# Patient Record
Sex: FEMALE | Race: WHITE
[De-identification: names, ages, dates, MRNs, and addresses within clinical notes are randomized per-mention and may not be internally consistent; named-entity substitution may affect disease eponyms.]

---

## 2018-06-20 ENCOUNTER — HOSPITAL ENCOUNTER (EMERGENCY)
Dept: HOSPITAL 41 - JD.ED | Age: 53
Discharge: HOME | End: 2018-06-20
Payer: COMMERCIAL

## 2018-06-20 DIAGNOSIS — R51: ICD-10-CM

## 2018-06-20 DIAGNOSIS — R07.89: Primary | ICD-10-CM

## 2018-06-20 DIAGNOSIS — R11.0: ICD-10-CM

## 2018-06-20 DIAGNOSIS — Z88.2: ICD-10-CM

## 2018-06-20 DIAGNOSIS — Z88.0: ICD-10-CM

## 2018-06-20 PROCEDURE — 70450 CT HEAD/BRAIN W/O DYE: CPT

## 2018-06-20 PROCEDURE — 80053 COMPREHEN METABOLIC PANEL: CPT

## 2018-06-20 PROCEDURE — 93005 ELECTROCARDIOGRAM TRACING: CPT

## 2018-06-20 PROCEDURE — 84484 ASSAY OF TROPONIN QUANT: CPT

## 2018-06-20 PROCEDURE — 85025 COMPLETE CBC W/AUTO DIFF WBC: CPT

## 2018-06-20 PROCEDURE — 96374 THER/PROPH/DIAG INJ IV PUSH: CPT

## 2018-06-20 PROCEDURE — 99285 EMERGENCY DEPT VISIT HI MDM: CPT

## 2018-06-20 PROCEDURE — 82962 GLUCOSE BLOOD TEST: CPT

## 2018-06-20 PROCEDURE — 36415 COLL VENOUS BLD VENIPUNCTURE: CPT

## 2018-06-20 PROCEDURE — 71045 X-RAY EXAM CHEST 1 VIEW: CPT

## 2018-06-20 NOTE — EDM.PDOC
ED HPI GENERAL MEDICAL PROBLEM





- General


Chief Complaint: Chest Pain


Stated Complaint: TIGHTNESS RIGHT SIDE, DIZZY


Time Seen by Provider: 06/20/18 20:38


Source of Information: Reports: Patient, Family


History Limitations: Reports: No Limitations





- History of Present Illness


INITIAL COMMENTS - FREE TEXT/NARRATIVE: 





The patient presents with sudden onset of chest tightness, dizziness and 

nausea.  This started before arrival when she was eating.  She is better now 

but a little nauseated.  She has no history of heart disease, HTN, 

hypercholesterolemia, or diabetes.  Her mother has LBBB but no other heart 

problems.  She does not smoke.  She has no swelling or pain in her legs and no 

history of DVT or PE.  She has no shortness of breath but she did get a little 

sweaty when this came on.


Onset: Sudden


Duration: Minutes:


Location: Reports: Chest


Quality: Reports: Other (Tightness)


Severity: Moderate


Improves with: Reports: None


Worsens with: Reports: None


Associated Symptoms: Reports: Chest Pain, Diaphoresis, Nausea/Vomiting.  Denies

: Confusion, Cough, Fever/Chills, Headaches, Shortness of Breath


  ** Chest


Pain Score (Numeric/FACES): 5





- Related Data


 Allergies











Allergy/AdvReac Type Severity Reaction Status Date / Time


 


penicillin V Allergy  Rash Verified 06/20/18 20:32


 


sulfacetamide Allergy  Rash Verified 06/20/18 20:32











Home Meds: 


 Home Meds





Aspirin 81 mg PO DAILY 06/20/18 [History]


Calcium Carb & Citrate/Vit D3 [Calcium + D3 ER Tablet] 1 tab PO DAILY 06/20/18 [

History]


Ethynodiol D-Ethinyl Estradiol [Kelnor 1-35 28 Tablet] 1 tab PO DAILY 06/20/18 [

History]











Past Medical History


HEENT History: Reports: Other (See Below)


Other HEENT History: TMJ





Social & Family History





- Tobacco Use


Smoking Status *Q: Never Smoker





- Recreational Drug Use


Recreational Drug Use: No





ED ROS GENERAL





- Review of Systems


Review Of Systems: See Below


Constitutional: Reports: No Symptoms


HEENT: Reports: No Symptoms


Respiratory: Reports: No Symptoms


Cardiovascular: Reports: Chest Pain, Other (Dizziness)


Endocrine: Reports: No Symptoms


GI/Abdominal: Reports: Nausea.  Denies: Abdominal Pain, Diarrhea, Vomiting


: Reports: No Symptoms


Musculoskeletal: Reports: No Symptoms





ED EXAM, GENERAL





- Physical Exam


Exam: See Below


Exam Limited By: No Limitations


General Appearance: Alert, No Apparent Distress


Ears: Normal External Exam


Nose: Normal Inspection


Head: Atraumatic, Normocephalic


Neck: Normal Inspection


Respiratory/Chest: No Respiratory Distress, Lungs Clear, Normal Breath Sounds


Cardiovascular: Regular Rate, Rhythm, No Edema, No Murmur


GI/Abdominal: Soft, Non-Tender, No Organomegaly, No Mass


Back Exam: Normal Inspection


Extremities: Normal Inspection





EKG INTERPRETATION


EKG Date: 06/20/18


Time: 20:35


Rhythm: Other (Sinus bradycardia)


Rate (Beats/Min): 57


Axis: Normal


P-Wave: Present


QRS: Normal


ST-T: Normal


QT: Normal





Course





- Vital Signs


Last Recorded V/S: 


 Last Vital Signs











Temp  97.8 F   06/20/18 20:33


 


Pulse  51 L  06/20/18 20:33


 


Resp  16   06/20/18 20:33


 


BP  170/84 H  06/20/18 20:33


 


Pulse Ox  99   06/20/18 20:33














- Orders/Labs/Meds


Orders: 


 Active Orders 24 hr











 Category Date Time Status


 


 Cardiac Monitoring [RC] .AS DIRECTED Care  06/20/18 20:43 Active


 


 EKG Documentation Completion [RC] STAT Care  06/20/18 20:44 Active


 


 Peripheral IV Care [RC] .AS DIRECTED Care  06/20/18 20:44 Active


 


 Chest 1V Frontal [CR] Stat Exams  06/20/18 20:44 Taken


 


 Head wo Cont [CT] Stat Exams  06/20/18 21:39 Taken


 


 Sodium Chloride 0.9% [Saline Flush] Med  06/20/18 20:43 Active





 10 ml FLUSH ASDIRECTED PRN   


 


 Peripheral IV Insertion Adult [OM.PC] Stat Oth  06/20/18 20:43 Ordered








 Medication Orders





Sodium Chloride (Saline Flush)  10 ml FLUSH ASDIRECTED PRN


   PRN Reason: Keep Vein Open


   Last Admin: 06/20/18 20:48  Dose: 10 ml








Labs: 


 Laboratory Tests











  06/20/18 06/20/18 06/20/18 Range/Units





  20:37 20:37 20:40 


 


WBC  5.67    (3.98-10.04)  K/mm3


 


RBC  4.69    (3.98-5.22)  M/mm3


 


Hgb  13.4    (11.2-15.7)  gm/L


 


Hct  40.5    (34.1-44.9)  %


 


MCV  86.4    (79.4-94.8)  fl


 


MCH  28.6    (25.6-32.2)  pg


 


MCHC  33.1    (32.2-35.5)  g/dl


 


RDW Std Deviation  41.9    (36.4-46.3)  fL


 


Plt Count  273    (182-369)  K/mm3


 


MPV  9.9    (9.4-12.3)  fl


 


Neut % (Auto)  52.0    (34.0-71.1)  %


 


Lymph % (Auto)  31.4    (19.3-51.7)  %


 


Mono % (Auto)  6.7    (4.7-12.5)  %


 


Eos % (Auto)  8.8 H    (0.7-5.8)  


 


Baso % (Auto)  0.7    (0.1-1.2)  %


 


Neut # (Auto)  2.95    (1.56-6.13)  K/mm3


 


Lymph # (Auto)  1.78    (1.18-3.74)  K/mm3


 


Mono # (Auto)  0.38 H    (0.24-0.36)  K/mm3


 


Eos # (Auto)  0.50 H    (0.04-0.36)  K/mm3


 


Baso # (Auto)  0.04    (0.01-0.08)  K/mm3


 


Sodium   139   (136-145)  mEq/L


 


Potassium   3.6   (3.5-5.1)  mEq/L


 


Chloride   102   ()  mEq/L


 


Carbon Dioxide   30   (21-32)  mEq/L


 


Anion Gap   10.6   (5-15)  


 


BUN   13   (7-18)  mg/dL


 


Creatinine   0.7   (0.55-1.02)  mg/dL


 


Est Cr Clr Drug Dosing   81.71   mL/min


 


Estimated GFR (MDRD)   > 60   (>60)  mL/min


 


BUN/Creatinine Ratio   18.6 H   (14-18)  


 


Glucose   109 H   ()  mg/dL


 


POC Glucose    109 H  ()  mg/dL


 


Calcium   8.8   (8.5-10.1)  mg/dL


 


Total Bilirubin   0.2   (0.2-1.0)  mg/dL


 


AST   17   (15-37)  U/L


 


ALT   24   (14-59)  U/L


 


Alkaline Phosphatase   63   ()  U/L


 


Troponin I   < 0.017   (0.00-0.056)  ng/mL


 


Total Protein   6.9   (6.4-8.2)  g/dl


 


Albumin   3.5   (3.4-5.0)  g/dl


 


Globulin   3.4   gm/dL


 


Albumin/Globulin Ratio   1.0   (1-2)  











Meds: 


Medications











Generic Name Dose Route Start Last Admin





  Trade Name Freq  PRN Reason Stop Dose Admin


 


Sodium Chloride  10 ml  06/20/18 20:43  06/20/18 20:48





  Saline Flush  FLUSH   10 ml





  ASDIRECTED PRN   Administration





  Keep Vein Open   





     





     





     














Discontinued Medications














Generic Name Dose Route Start Last Admin





  Trade Name Freq  PRN Reason Stop Dose Admin


 


Ondansetron HCl  4 mg  06/20/18 20:44  06/20/18 20:48





  Zofran  IVPUSH  06/20/18 20:45  4 mg





  ONETIME ONE   Administration





     





     





     





     














- Re-Assessments/Exams


Free Text/Narrative Re-Assessment/Exam: 





06/20/18 21:07


I ordered an IV saline lock, EKG, CXR, labs and zofran 4mg IV.  Her EKG shows a 

sinus bradycardia with no acute changes.


06/20/18 22:41


Her CXR looks good.  Her CBC and CMP look good.  Her troponin is negative.  She 

was telling me that she has been having some pressure to the right side of her 

neck and it radiates to her head.  She slept on an air mattress when this all 

started.  She has been adjusted a few times and that usually helps but she has 

more pressure now.  I ordered a CT of her head and that looked good.  She feels 

better.  I will discharge her home.





Departure





- Departure


Time of Disposition: 22:45


Disposition: Home, Self-Care 01


Condition: Good


Clinical Impression: 


 Pressure in head, Atypical chest pain, Nausea





Referrals: 


Wali Lopez MD [Primary Care Provider] - 1 Week


Forms:  ED Department Discharge


Additional Instructions: 


Follow up with Dr Lopez in 1 week.  Please return if you are worse.





- My Orders


Last 24 Hours: 


My Active Orders





06/20/18 20:43


Cardiac Monitoring [RC] .AS DIRECTED 


Sodium Chloride 0.9% [Saline Flush]   10 ml FLUSH ASDIRECTED PRN 


Peripheral IV Insertion Adult [OM.PC] Stat 





06/20/18 20:44


EKG Documentation Completion [RC] STAT 


Peripheral IV Care [RC] .AS DIRECTED 


Chest 1V Frontal [CR] Stat 





06/20/18 21:39


Head wo Cont [CT] Stat 














- Assessment/Plan


Last 24 Hours: 


My Active Orders





06/20/18 20:43


Cardiac Monitoring [RC] .AS DIRECTED 


Sodium Chloride 0.9% [Saline Flush]   10 ml FLUSH ASDIRECTED PRN 


Peripheral IV Insertion Adult [OM.PC] Stat 





06/20/18 20:44


EKG Documentation Completion [RC] STAT 


Peripheral IV Care [RC] .AS DIRECTED 


Chest 1V Frontal [CR] Stat 





06/20/18 21:39


Head wo Cont [CT] Stat

## 2018-06-21 NOTE — CT
Head CT

 

Technique: Multiple axial sections through the brain were obtained.  

Intravenous contrast was not utilized.

 

Comparison: Prior MRI brain of 03/10/16.

 

Findings: Ventricles along with basal cisterns and sulci over the 

convexities are within normal limits for the patient's age.  No 

abnormal parenchymal densities are seen.  No evidence of intracranial 

hemorrhage.  No midline shift or mass effect is seen.

 

Bone window settings were reviewed which show the visualized sinuses 

to appear clear.  No acute calvarial abnormality is identified.

 

Impression:

1.  Nothing acute is seen on noncontrast head CT exam.

 

Diagnostic code #1

 

I agree with preliminary report from [facility], finalized at 06/20/18, 

11:08 PM Central Time

## 2018-06-21 NOTE — CR
Chest: Portable view of the chest was obtained.

 

Comparison: No prior chest x-ray.

 

Heart size and mediastinum are normal.  Lungs are clear.  Bony 

structures are grossly intact.

 

Impression:

1.  Nothing acute is seen on portable chest x-ray.

 

Diagnostic code #1